# Patient Record
Sex: MALE | Race: WHITE | URBAN - METROPOLITAN AREA
[De-identification: names, ages, dates, MRNs, and addresses within clinical notes are randomized per-mention and may not be internally consistent; named-entity substitution may affect disease eponyms.]

---
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
## 2021-04-08 ENCOUNTER — VIRTUAL VISIT (OUTPATIENT)
Dept: FAMILY MEDICINE | Facility: CLINIC | Age: 21
End: 2021-04-08
Payer: COMMERCIAL

## 2021-04-08 ENCOUNTER — NURSE TRIAGE (OUTPATIENT)
Dept: NURSING | Facility: CLINIC | Age: 21
End: 2021-04-08

## 2021-04-08 DIAGNOSIS — J02.9 ACUTE SORE THROAT: Primary | ICD-10-CM

## 2021-04-08 PROCEDURE — 99213 OFFICE O/P EST LOW 20 MIN: CPT | Mod: TEL | Performed by: PEDIATRICS

## 2021-04-08 NOTE — Clinical Note
Please call patient and schedule ancillary for rapid strept if none available then patient should come to [facility] to have that done

## 2021-04-08 NOTE — PROGRESS NOTES
"Jack is a 21 year old who is being evaluated via a billable telephone visit.      What phone number would you like to be contacted at? [phone]  How would you like to obtain your AVS? Mail a copy    Assessment & Plan     Acute sore throat  Patient refused COVID test   Rapid strept ordered \  Routed chart to team to schedule with ancillary  Symptomatic supportive care  Tylenol or Ibu[rpfen PO every 6 hours as needed pain/fever  Gargle with Epson salt and warm water    Will call with results of rapid strept if neg will await result of throat cult to treat with antibiotics if positive for strept  Discussed warning signs of reasons to return or go to ER  Patient understands and agrees with treatment and plan and had no further questions    - Streptococcus A Rapid Scr w Reflx to PCR        Return in about 3 days (around 4/11/2021), or if symptoms worsen or fail to improve.    Marylou Neumann MD  [facility]    Subjective   Jack is a 21 year old who presents for the following health issues     HPI       20 yo male, new patient to provider, on phone visit because   Has been having \"cold symptoms\" for the past 3 days with clear rhinorrhea and nasal congestion  Yesterday night started with sore throat  Denies any fever, no oral lesions, no rashes, no vomit, no diarrhea, no cough or difficulty breathing, no headache, no sinus pressure  No known sick contacts  Able to drink with mild pain, denies any drooling, no hot potato voice    Review of Systems   Constitutional, HEENT, cardiovascular, pulmonary, gi and gu systems are negative, except as otherwise noted.      Objective           Vitals:  No vitals were obtained today due to virtual visit.    Physical Exam   healthy, alert and no distress  PSYCH: Alert and oriented times   RESP: No cough, no audible wheezing, able to talk in full sentences  Remainder of exam unable to be completed due to telephone visits                Phone call duration: 5 " minutes

## 2021-04-08 NOTE — PROGRESS NOTES
There aren't any ancillary appts available. Called and left a voicemail message for patient to come to UC to have this test done and left the hours that UC is open.  Светлана Bucio [facility]  2nd Floor  Primary Care

## 2021-04-09 NOTE — TELEPHONE ENCOUNTER
Jassi and his father called about his lab result.    FNA advised per his chart, his rapid strep was negative and the culture is in process.    Caller verbalizes understanding.    Reason for Disposition    Caller requesting lab results    Additional Information    Negative: ED call to PCP    Negative: Physician call to PCP    Negative: Call about patient who is currently hospitalized    Negative: Lab or radiology calling with CRITICAL test results    Negative: [1] Prescription not at pharmacy AND [2] was prescribed today by PCP    Negative: [1] Follow-up call from patient regarding patient's clinical status AND [2] information urgent    Negative: [1] Caller requests to speak ONLY to PCP AND [2] urgent question    Negative: [1] Caller requests to speak to PCP now AND [2] won't tell us reason for call  (Exception: if 10 pm to 6 am, caller must first discuss reason for the call)    Negative: Notification of hospital admission    Negative: Notification of death    Protocols used: PCP CALL - NO TRIAGE-A-

## 2021-04-11 ENCOUNTER — HEALTH MAINTENANCE LETTER (OUTPATIENT)
Age: 21
End: 2021-04-11

## 2021-09-25 ENCOUNTER — HEALTH MAINTENANCE LETTER (OUTPATIENT)
Age: 21
End: 2021-09-25

## 2022-05-07 ENCOUNTER — HEALTH MAINTENANCE LETTER (OUTPATIENT)
Age: 22
End: 2022-05-07

## 2022-12-26 ENCOUNTER — HEALTH MAINTENANCE LETTER (OUTPATIENT)
Age: 22
End: 2022-12-26

## 2023-06-02 ENCOUNTER — HEALTH MAINTENANCE LETTER (OUTPATIENT)
Age: 23
End: 2023-06-02

## 2023-12-04 NOTE — LETTER
Date:November 8, 2017      Patient was self referred, no letter generated. Do not send.        [facility] Information       05-Dec-2023 02:21

## 2024-06-23 ENCOUNTER — HEALTH MAINTENANCE LETTER (OUTPATIENT)
Age: 24
End: 2024-06-23

## 2025-07-12 ENCOUNTER — HEALTH MAINTENANCE LETTER (OUTPATIENT)
Age: 25
End: 2025-07-12